# Patient Record
Sex: FEMALE | Race: OTHER
[De-identification: names, ages, dates, MRNs, and addresses within clinical notes are randomized per-mention and may not be internally consistent; named-entity substitution may affect disease eponyms.]

---

## 2019-06-07 VITALS — SYSTOLIC BLOOD PRESSURE: 112 MMHG | DIASTOLIC BLOOD PRESSURE: 76 MMHG

## 2021-04-16 ENCOUNTER — HOSPITAL ENCOUNTER (OUTPATIENT)
Dept: HOSPITAL 61 - LAB | Age: 26
End: 2021-04-16
Attending: OBSTETRICS & GYNECOLOGY
Payer: SELF-PAY

## 2021-04-16 DIAGNOSIS — Z01.812: Primary | ICD-10-CM

## 2021-04-16 DIAGNOSIS — Z20.822: ICD-10-CM

## 2021-04-16 PROCEDURE — U0003 INFECTIOUS AGENT DETECTION BY NUCLEIC ACID (DNA OR RNA); SEVERE ACUTE RESPIRATORY SYNDROME CORONAVIRUS 2 (SARS-COV-2) (CORONAVIRUS DISEASE [COVID-19]), AMPLIFIED PROBE TECHNIQUE, MAKING USE OF HIGH THROUGHPUT TECHNOLOGIES AS DESCRIBED BY CMS-2020-01-R: HCPCS

## 2021-04-19 ENCOUNTER — HOSPITAL ENCOUNTER (INPATIENT)
Dept: HOSPITAL 61 - 3 SO LND | Age: 26
LOS: 2 days | Discharge: HOME | End: 2021-04-21
Attending: OBSTETRICS & GYNECOLOGY | Admitting: OBSTETRICS & GYNECOLOGY
Payer: SELF-PAY

## 2021-04-19 VITALS — BODY MASS INDEX: 32.95 KG/M2 | WEIGHT: 193 LBS | HEIGHT: 64 IN

## 2021-04-19 VITALS — DIASTOLIC BLOOD PRESSURE: 60 MMHG | SYSTOLIC BLOOD PRESSURE: 109 MMHG

## 2021-04-19 VITALS — SYSTOLIC BLOOD PRESSURE: 104 MMHG | DIASTOLIC BLOOD PRESSURE: 57 MMHG

## 2021-04-19 VITALS — DIASTOLIC BLOOD PRESSURE: 60 MMHG | SYSTOLIC BLOOD PRESSURE: 110 MMHG

## 2021-04-19 VITALS — DIASTOLIC BLOOD PRESSURE: 78 MMHG | SYSTOLIC BLOOD PRESSURE: 125 MMHG

## 2021-04-19 VITALS — DIASTOLIC BLOOD PRESSURE: 66 MMHG | SYSTOLIC BLOOD PRESSURE: 113 MMHG

## 2021-04-19 DIAGNOSIS — K66.0: ICD-10-CM

## 2021-04-19 DIAGNOSIS — Z3A.39: ICD-10-CM

## 2021-04-19 DIAGNOSIS — O34.211: Primary | ICD-10-CM

## 2021-04-19 LAB
ERYTHROCYTE [DISTWIDTH] IN BLOOD BY AUTOMATED COUNT: 15.1 % (ref 11.5–14.5)
HCT VFR BLD CALC: 38.8 % (ref 36–47)
HGB BLD-MCNC: 13.2 G/DL (ref 12–15.5)
MCH RBC QN AUTO: 30 PG (ref 25–35)
MCHC RBC AUTO-ENTMCNC: 34 G/DL (ref 31–37)
MCV RBC AUTO: 88 FL (ref 79–100)
PLATELET # BLD AUTO: 192 X10^3/UL (ref 140–400)
RBC # BLD AUTO: 4.4 X10^6/UL (ref 3.5–5.4)
WBC # BLD AUTO: 9.9 X10^3/UL (ref 4–11)

## 2021-04-19 PROCEDURE — 86900 BLOOD TYPING SEROLOGIC ABO: CPT

## 2021-04-19 PROCEDURE — 86901 BLOOD TYPING SEROLOGIC RH(D): CPT

## 2021-04-19 PROCEDURE — 86850 RBC ANTIBODY SCREEN: CPT

## 2021-04-19 PROCEDURE — 36415 COLL VENOUS BLD VENIPUNCTURE: CPT

## 2021-04-19 PROCEDURE — 86592 SYPHILIS TEST NON-TREP QUAL: CPT

## 2021-04-19 PROCEDURE — G0378 HOSPITAL OBSERVATION PER HR: HCPCS

## 2021-04-19 PROCEDURE — 85027 COMPLETE CBC AUTOMATED: CPT

## 2021-04-19 PROCEDURE — C1755 CATHETER, INTRASPINAL: HCPCS

## 2021-04-19 RX ADMIN — SODIUM CHLORIDE, SODIUM LACTATE, POTASSIUM CHLORIDE, AND CALCIUM CHLORIDE SCH MLS/HR: .6; .31; .03; .02 INJECTION, SOLUTION INTRAVENOUS at 17:43

## 2021-04-19 RX ADMIN — SODIUM CHLORIDE, SODIUM LACTATE, POTASSIUM CHLORIDE, AND CALCIUM CHLORIDE SCH MLS/HR: .6; .31; .03; .02 INJECTION, SOLUTION INTRAVENOUS at 12:30

## 2021-04-19 NOTE — OP
DATE OF SURGERY:  2021



PREOPERATIVE DIAGNOSES:

1.  Intrauterine pregnancy at 39 weeks and 2 days by LMP equal to 22-week

ultrasound.

2.  Previous  section x 1, desires repeat.

3.  Desires permanent sterilization.  Consent signed on 2021, so not

matured.

4.  Estimated fetal weight in the 84th percentile.

5.  Gestational diabetes highly likely based on a glucose tolerance test above

200, although the patient never performed fingersticks.

6.  GBS negative.



POSTOPERATIVE DIAGNOSES:

1.  Intrauterine pregnancy at 39 weeks and 2 days by LMP equal to 22-week

ultrasound.

2.  Previous  section x 1, desires repeat.

3.  Desires permanent sterilization.  Consent signed on 2021, so not

matured.

4.  Estimated fetal weight in the 84th percentile.

5.  Gestational diabetes highly likely based on a glucose tolerance test above

200, although the patient never performed fingersticks.

6.  GBS negative.



PROCEDURE:  Repeat low transverse .



SURGEON:  Heriberto Ardon MD



ANESTHESIA:  Spinal.



ESTIMATED BLOOD LOSS:  800 mL.



FLUIDS:  1400 mL.



URINE OUTPUT:  375 mL.



COMPLICATIONS:  None.



FINDINGS:  Viable male infant delivered at 0854, weighing 9 pounds 14 ounces

with Apgars of 8 and 8.  Peritoneal and omental adhesions on the left anterior

uterus, also some thin serosal adhesions to the left tube and round ligament.



PATHOLOGY:  Cord blood.



DESCRIPTION OF PROCEDURE:  The patient was taken to the operating room where

spinal anesthesia was placed without difficulty.  The patient was prepped and

draped in normal sterile fashion with a left lateral tilt.  A Pfannenstiel skin

incision was made through a previous incision and carried down to the underlying

layer of fascia.  The fascia was then nicked in the midline.  The fascial

incision was then extended laterally with Kumar scissors.  Superior aspect of the

fascial incision was then grasped with Kocher clamps, elevated and the

underlying rectus muscle was dissected off with the scalpel.  Attention was then

turned to the inferior aspect of the fascial incision, which again was grasped 
with 

Kocher clamps, elevated and the rectus muscle was dissected off with Kumar 

scissors.  The midline of the rectus muscle was identified and .  The 

peritoneum was then grasped with 2 hemostats and tented up and entered sharply 

with Metzenbaum scissors. Digital examination of the peritoneal cavity revealed 

some adhesions on the left lateral aspect.  This was below the peritoneal 
incision, 

so the peritoneum was then extended superiorly and inferiorly with good 
visualization 

of the bladder with traction and countertraction.  The peritoneal adhesions 
remained 

present after the extension of the peritoneum, but were able to be taken down 
bluntly

to allow for the Shelton ring to be placed to better visualize the lower uterine 
segment.  

Metzenbaum scissors were then used to create a bladder flap.  Lower uterine 

segment was then incised in transverse fashion with the scalpel.  The uterine 
incision 

was then extended with traction and countertraction.  The head was then flexed 
and 

brought to the hysterotomy.  The rest of infant was delivered atraumatically.  
The 

nose and mouth were bulb suctioned.  The cord was double clamped and cut.  

Infant was handed over to the awaiting neonatologist. Placenta was then removed 

manually and the uterus was cleared of all clots and debris.  Uterine incision 
was 

then repaired with #1 chromic in a running locked fashion.  Second layer of the 
same 

suture was used to imbricate.  There was an area at the left lateral edge that 
appeared 

to be bleeding.  A figure-of-eight stitch with 2-0 chromic was then placed to 
achieve 

hemostasis.  The suture was then used to place 2 locked stitches to achieve 

hemostasis.  Once hemostasis was achieved, the uterus was then exteriorized to 
see 

if there is any additional adhesions.  It was noted that the omentum was 
attached to 

the left side of the anterior uterus.  This was then taken down with the Bovie 
to restore 

normal anatomy.  There were also some adhesions of the peritoneum to the left 
tube 

and round ligament.  This was then taken down with the Bovie.  The uterus was 
then 

returned to the abdomen. The gutters were copiously irrigated and cleared of 
clots 

and debris.  It was noted during the irrigation that the left round ligament was


bleeding.  A 3-0 chromic was then placed with a figure-of-eight stitch to 
achieve 

hemostasis.  The irrigation was continued and good hemostasis was noted.  At 
that 

point, the Shelton ring was then removed. The peritoneum was then closed with 2-0


Vicryl in a running fashion.  The muscle was reapproximated with 2-0 Vicryl in a


running fashion.  The fascia was then closed with 0 Vicryl in a running fashion.
 The 

skin was closed with 3-0 Monocryl in a subcuticular manner.  Sponge, laps, and 

needles were correct x 3. Two grams of Ancef were given prior to procedure.  The


patient was taken to the recovery room in stable condition.

 



______________________________

HERIBERTO ARDON MD



DR:  RACHAEL/[email or web address]  JOB#:  [id number] / [id number]

DD:  2021 11:11  DT:  2021 11:23

LEONARDO

## 2021-04-19 NOTE — NUR
The patient, ALEXIS ARENAS, 26 y/o, F admitted by ALEN ARDON MD, was given 
written information regarding hospital policies, unit procedures and contact persons.  
Valuables were checked and left with pt. Pt. educated about POC. Pt. verbalized 
understanding and is agreeable to POC. All questions answered at this time.  
ID#[id number].

## 2021-04-19 NOTE — PDOC1
OB/GYN H&P


Date of Admission:


Date of Admission:  2021 at 05:59





History of Present Illness:


EDC: 21


LMP: 21





25y  @ 39.2 by L=22 presents for scheduled C/S.  The consent that the pt 

signed earlier in the pregnancy had to be redone.  The new consent has not 

matured.  The pt initially desired a repeat C/S so that the BTL could be done at

the same time.  With the understanding that the BTL can not be done she still de

sires the repeat C/S.





PMH: Denies


PSH: C/S x 1


Meds: PNV, ASA


All: NKDA


OBHx: 2 x TSVD, 1 x TC/S 


SH: no tob, no EtOH


FH: noncontributory





Medications:


Meds:





Current Medications








 Medications


  (Trade)  Dose


 Ordered  Sig/Josafat


 Route


 PRN Reason  Start Time


 Stop Time Status Last Admin


Dose Admin


 


 Ringer's Solution  1,000 ml @ 


 1,000 mls/hr  Q1H


 IV


   21 06:15


 21 07:14 DC 21 06:28














Allergies:


Coded Allergies:  


     No Known Drug Allergies (Unverified , 19)





Physical Exam:


Vital Signs:





                                   Vital Signs








  Date Time  Temp Pulse Resp B/P (MAP) Pulse Ox O2 Delivery O2 Flow Rate FiO2


 


21 07:21 99.2 104 18 125/78 (94) 98 Room Air  





 99.2       








PE:


GENERAL:       No apparent distress. Alert and oriented.


HEENT:            Head normocephalic, atraumatic.


NECK:              Supple


LUNGS:            Clear to auscultation.


HEART:            RRR, S1, S2 present, pulses intact


ABDOMEN:       Soft, positive bowel sounds.


EXTREMITIES:  No cyanosis or edema.


NEUROLOGIC:  Normal speech, normal tone


PSYCHIATRIC:  Normal affect, normal mood.


SKIN:                No ulceration.





FHT: 140s +acels/no decels/mLTV


Ragland: 10-15 min


Labs:





                                Laboratory Tests








Test


 21


06:30


 


White Blood Count


 9.9 x10^3/uL


(4.0-11.0)


 


Red Blood Count


 4.40 x10^6/uL


(3.50-5.40)


 


Hemoglobin


 13.2 g/dL


(12.0-15.5)


 


Hematocrit


 38.8 %


(36.0-47.0)


 


Mean Corpuscular Volume


 88 fL ()





 


Mean Corpuscular Hemoglobin 30 pg (25-35)  


 


Mean Corpuscular Hemoglobin


Concent 34 g/dL


(31-37)


 


Red Cell Distribution Width


 15.1 %


(11.5-14.5)  H


 


Platelet Count


 192 x10^3/uL


(140-400)





                                Laboratory Tests


21 06:30








                                Laboratory Tests


21 06:30











Assessment & Plan:





A/P 25y  @ 39.2 by L=22


1.) Prev C/S x 1 - desires repeat


2.) DPS - consent signed 21, may not mature by the time of delivery


3.) EFW 84%ile - base on LMP, but LMP was already 13 day difference from 22wk 

dating u/s


4.) GDM - Never performed 3hr GTT, based on the value >200. Did not bring FSBS 

logs, sounds like her fastings have been elevated


5.) TDAP given 3/1/21


6.) Flu 20


7.) Fetus  cat I FHT


8.) GBS neg











ALEN ARDON MD             2021 07:27

## 2021-04-19 NOTE — PDOC4
OPERATIVE NOTE:


PreOp Dx: 1.) IUP @ 39.2 by L=22, 2.) Prev C/S x 1 - desires repeat, 3.) DPS - 

consent signed 4/1/21, not matured, 4.) EFW 84%ile, 5.) GDM, 6.) GBS neg


PostOp Dx: same


Procedure: RLTCS


Surgeon: SHELLY Ardon


Anesthesia: Spinal


EBL: 800 cc


Fluids: 1400 cc


UOP: 3750 cc


Complications: None


Findings: viable male infant delivered at 0854.  Wt 9 lb 14 oz.  APGARS 8/8.  

Peritoneal and omental adhesions on the left anterior uterus.  Thin serosal 

adhesions over the left tube and round ligament.


Path: Cord blood











ALEN ARDON MD             Apr 19, 2021 11:05

## 2021-04-20 VITALS — DIASTOLIC BLOOD PRESSURE: 78 MMHG | SYSTOLIC BLOOD PRESSURE: 119 MMHG

## 2021-04-20 VITALS — DIASTOLIC BLOOD PRESSURE: 52 MMHG | SYSTOLIC BLOOD PRESSURE: 104 MMHG

## 2021-04-20 VITALS — DIASTOLIC BLOOD PRESSURE: 50 MMHG | SYSTOLIC BLOOD PRESSURE: 100 MMHG

## 2021-04-20 VITALS — SYSTOLIC BLOOD PRESSURE: 107 MMHG | DIASTOLIC BLOOD PRESSURE: 64 MMHG

## 2021-04-20 VITALS — DIASTOLIC BLOOD PRESSURE: 63 MMHG | SYSTOLIC BLOOD PRESSURE: 110 MMHG

## 2021-04-20 VITALS — SYSTOLIC BLOOD PRESSURE: 111 MMHG | DIASTOLIC BLOOD PRESSURE: 70 MMHG

## 2021-04-20 LAB
ERYTHROCYTE [DISTWIDTH] IN BLOOD BY AUTOMATED COUNT: 15.6 % (ref 11.5–14.5)
HCT VFR BLD CALC: 31.7 % (ref 36–47)
HGB BLD-MCNC: 10.9 G/DL (ref 12–15.5)
MCH RBC QN AUTO: 31 PG (ref 25–35)
MCHC RBC AUTO-ENTMCNC: 34 G/DL (ref 31–37)
MCV RBC AUTO: 89 FL (ref 79–100)
PLATELET # BLD AUTO: 175 X10^3/UL (ref 140–400)
RBC # BLD AUTO: 3.56 X10^6/UL (ref 3.5–5.4)
WBC # BLD AUTO: 9.6 X10^3/UL (ref 4–11)

## 2021-04-20 RX ADMIN — IBUPROFEN PRN MG: 400 TABLET ORAL at 17:32

## 2021-04-20 RX ADMIN — Medication SCH TAB: at 17:33

## 2021-04-20 RX ADMIN — MULTIPLE VITAMINS W/ MINERALS TAB SCH TAB: TAB at 17:33

## 2021-04-20 NOTE — PDOC
OB/GYN PROGRESS NOTE


Date of Service:


DATE: 21 


TIME: 09:21





Subjective:


Pt with good pain control.  Tu PO.  Voiding.  Minimal lochia.





Objective:


Vital Signs:





Vital Signs








  Date Time  Temp Pulse Resp B/P (MAP) Pulse Ox O2 Delivery O2 Flow Rate FiO2


 


21 07:21 99.2 104 18 125/78 (94) 98 Room Air  





 99.2       








Vital Signs








  Date Time  Temp Pulse Resp B/P (MAP) Pulse Ox O2 Delivery O2 Flow Rate FiO2


 


21 06:22 98.5 90 18 100/50 (67) 97 Room Air  





 98.5       








Labs:





                                Laboratory Tests








Test


 21


08:25


 


White Blood Count


 9.6 x10^3/uL


(4.0-11.0)


 


Red Blood Count


 3.56 x10^6/uL


(3.50-5.40)


 


Hemoglobin


 10.9 g/dL


(12.0-15.5)  L


 


Hematocrit


 31.7 %


(36.0-47.0)  L


 


Mean Corpuscular Volume


 89 fL ()





 


Mean Corpuscular Hemoglobin 31 pg (25-35)  


 


Mean Corpuscular Hemoglobin


Concent 34 g/dL


(31-37)


 


Red Cell Distribution Width


 15.6 %


(11.5-14.5)  H


 


Platelet Count


 175 x10^3/uL


(140-400)





                                Laboratory Tests


21 08:25








                                Laboratory Tests


21 08:25











Physical Exam:


GENERAL:       No apparent distress. Alert and oriented.


HEENT:            Head normocephalic, atraumatic.


NECK:              Supple


LUNGS:            Clear to auscultation.


HEART:            RRR, S1, S2 present, pulses intact


ABDOMEN:       Soft, positive bowel sounds.


EXTREMITIES:  No cyanosis or edema.


NEUROLOGIC:  Normal speech, normal tone


PSYCHIATRIC:  Normal affect, normal mood.


SKIN:                No ulceration.





FFNT below umb


No C/C/E


Inc: C/D/I





Assessment & Plan:





A/P 25y  POD #1 s/p RLTCS


1.) PO  doing well


2.) GDM  will get fasting in the am, 2hr GTT at PP visit


3.) Hgb 13.2 -> 10.9


4.) TDAP given 3/1/21


5.) Flu 11/17/20


6.) Cont PO ALEN Juarez B MD             2021 09:21

## 2021-04-21 VITALS — SYSTOLIC BLOOD PRESSURE: 125 MMHG | DIASTOLIC BLOOD PRESSURE: 88 MMHG

## 2021-04-21 VITALS — SYSTOLIC BLOOD PRESSURE: 112 MMHG | DIASTOLIC BLOOD PRESSURE: 76 MMHG

## 2021-04-21 VITALS — SYSTOLIC BLOOD PRESSURE: 113 MMHG | DIASTOLIC BLOOD PRESSURE: 78 MMHG

## 2021-04-21 RX ADMIN — MULTIPLE VITAMINS W/ MINERALS TAB SCH TAB: TAB at 08:41

## 2021-04-21 RX ADMIN — IBUPROFEN PRN MG: 400 TABLET ORAL at 07:49

## 2021-04-21 RX ADMIN — Medication SCH TAB: at 08:42

## 2021-04-21 NOTE — PDOC
OB/GYN PROGRESS NOTE


Date of Service:


DATE: 21 


TIME: 13:18





Subjective:


Pt with good pain control.  Tu PO.  Voiding.  Minimal lochia





Objective:


Vital Signs:





Vital Signs








  Date Time  Temp Pulse Resp B/P (MAP) Pulse Ox O2 Delivery O2 Flow Rate FiO2


 


21 09:55 98.2 83 20 110/63 (79) 98 Room Air  





 98.2       








Vital Signs








  Date Time  Temp Pulse Resp B/P (MAP) Pulse Ox O2 Delivery O2 Flow Rate FiO2


 


21 12:40 98.9 89 18 125/88 (100) 98 Room Air  





 98.9       











Physical Exam:


GENERAL:       No apparent distress. Alert and oriented.


HEENT:            Head normocephalic, atraumatic.


NECK:              Supple


LUNGS:            Clear to auscultation.


HEART:            RRR, S1, S2 present, pulses intact


ABDOMEN:       Soft, positive bowel sounds.


EXTREMITIES:  No cyanosis or edema.


NEUROLOGIC:  Normal speech, normal tone


PSYCHIATRIC:  Normal affect, normal mood.


SKIN:                No ulceration.





FFNT below umb


No C/C/E


Inc: C/D/I





Assessment & Plan:





A/P 25y  POD #2 s/p RLTCS


1.) PO  doing well


2.) GDM  2hr GTT at PP visit


3.) Hgb 13.2 -> 10.9


4.) TDAP given 3/1/21


5.) Flu 20


6.) D/c home











ALEN ARDON MD             2021 13:19

## 2021-04-21 NOTE — NUR
Discharge instructions given to pt via [employer]  phone.  Pt verbalized 
understanding.  Pt denies any questions. Pt discharged home with significant other and 
infant.

## 2021-04-22 NOTE — DS
DATE OF DISCHARGE: 2021



ADMISSION DIAGNOSES:

1.  Intrauterine pregnancy at 39 weeks and 2 days by last menstrual period equal

to a 22-week ultrasound.

2.  Previous  section x1, desires repeat.

3.  Desires permanent sterilization.  Consent signed on 2021, but not 

matured by the time of delivery.

4.  Estimated fetal weight of 84 percentile.

5.  Gestational diabetes with a glucose tolerance test over 200.  The patient 

did not regularly record fingerstick blood sugars.

6.  Group B Streptococcus negative.



DISCHARGE DIAGNOSES:

1.  Intrauterine pregnancy at 39 weeks and 2 days by last menstrual period equal

to a 22-week ultrasound.

2.  Previous  section x1, desires repeat.

3.  Desires permanent sterilization.  Consent signed on 2021, but not 

matured by the time of delivery.

4.  Estimated fetal weight of 84 percentile.

5.  Gestational diabetes with a glucose tolerance test over 200.  The patient 

did not regularly record fingerstick blood sugars.

6.  Group B Streptococcus negative.



PROCEDURE: Repeat low transverse .



BRIEF HOSPITAL COURSE:  The patient is a 25-year-old  4, para 3-0-0-3, 

who presented to Labor and Delivery at 39 weeks and 2 days by LMP equal to a 

22-week ultrasound for a scheduled .  The patient had desired permanent

sterilization.  Consent was signed earlier in the pregnancy, but due to errors 

in the consent form, it had to be redone.  By the time it was re-signed, the 

consent had not matured.  The patient ultimately decided that she still wanted a

 instead of a trial of labor, even knowing that a tubal could not be 

performed at the same time.  The patient underwent the said procedure on 

2021.  See operative note for full details.  By postoperative day #2, the 

patient was meeting all discharge criteria and was subsequently discharged home.

 Of note, the patient's hemoglobin on admission was 13.2, and after delivery, 

was found to be 10.9.



DISCHARGE INSTRUCTIONS:  The patient was told not to lift anything greater than 

20 pounds, have pelvic rest for 6 weeks and not to drive on narcotics.



CALL IF: The patient was to call if she had fevers, chills, nausea, vomiting, 
abdominal 

pain or any additional questions or concerns.



FOLLOWUP APPOINTMENT:  The patient was to follow up on 2021 at 1:00 p.m. 

for an incision check.



DISCHARGE MEDICATIONS:  The patient was given a prescription for Percocet 5 

fifteen pills, Motrin 800 mg 30 pills, and Colace 100 mg 30 pills.







YANET

DR: Noa   DD: 2021 13:23

DT: 2021 21:51   TID: [id number]

MTDD